# Patient Record
Sex: FEMALE | Race: WHITE | HISPANIC OR LATINO | Employment: UNEMPLOYED | URBAN - METROPOLITAN AREA
[De-identification: names, ages, dates, MRNs, and addresses within clinical notes are randomized per-mention and may not be internally consistent; named-entity substitution may affect disease eponyms.]

---

## 2017-01-26 PROBLEM — Z34.90 PREGNANCY WITH ONE FETUS: Status: ACTIVE | Noted: 2017-01-26

## 2017-01-31 ENCOUNTER — TELEPHONE (OUTPATIENT)
Dept: OBSTETRICS AND GYNECOLOGY | Facility: HOSPITAL | Age: 22
End: 2017-01-31

## 2017-02-01 ENCOUNTER — TELEPHONE (OUTPATIENT)
Dept: OBSTETRICS AND GYNECOLOGY | Facility: HOSPITAL | Age: 22
End: 2017-02-01

## 2017-02-01 NOTE — TELEPHONE ENCOUNTER
"Lactation Telephone Follow up:  Spoke with pt.Lactation Telephone Follow up:  Spoke with pt via [employer] language line op# [id number].  Reports baby breastfeeding well 8 - 12 times in 24 hours and only supplementing with 1 or 2 bottles per day.  Reports 4 -5 wets and 5 yellow stools in the past 24 hours.  Denies any c/o or concerns.  Reports that she has a pediatrician appt with bilirubin check tomorrow.  Encouraged to call hotline # prn.  States "understand" and verbalized appropriate recall.  "

## 2018-08-21 ENCOUNTER — HOSPITAL ENCOUNTER (EMERGENCY)
Facility: HOSPITAL | Age: 23
Discharge: HOME | End: 2018-08-21
Attending: EMERGENCY MEDICINE

## 2018-08-21 VITALS
DIASTOLIC BLOOD PRESSURE: 78 MMHG | SYSTOLIC BLOOD PRESSURE: 121 MMHG | BODY MASS INDEX: 27.48 KG/M2 | WEIGHT: 140 LBS | HEIGHT: 60 IN | HEART RATE: 87 BPM | TEMPERATURE: 99 F | OXYGEN SATURATION: 100 %

## 2018-08-21 DIAGNOSIS — K08.89 PAIN, DENTAL: Primary | ICD-10-CM

## 2018-08-21 LAB
B-HCG UR QL: NEGATIVE
CTP QC/QA: YES

## 2018-08-21 PROCEDURE — 99284 EMERGENCY DEPT VISIT MOD MDM: CPT

## 2018-08-21 PROCEDURE — 99283 EMERGENCY DEPT VISIT LOW MDM: CPT | Mod: 25,,, | Performed by: EMERGENCY MEDICINE

## 2018-08-21 PROCEDURE — 64450 NJX AA&/STRD OTHER PN/BRANCH: CPT | Mod: ,,, | Performed by: EMERGENCY MEDICINE

## 2018-08-21 PROCEDURE — 81025 URINE PREGNANCY TEST: CPT | Performed by: EMERGENCY MEDICINE

## 2018-08-21 RX ORDER — NAPROXEN SODIUM 550 MG/1
550 TABLET ORAL EVERY 12 HOURS PRN
Qty: 30 TABLET | Refills: 0 | Status: SHIPPED | OUTPATIENT
Start: 2018-08-21

## 2018-08-21 RX ORDER — PENICILLIN V POTASSIUM 500 MG/1
500 TABLET, FILM COATED ORAL 4 TIMES DAILY
Qty: 28 TABLET | Refills: 0 | Status: SHIPPED | OUTPATIENT
Start: 2018-08-21 | End: 2018-08-28

## 2018-08-22 NOTE — ED TRIAGE NOTES
Rachael Olson, a 22 y.o. female presents to the ED for dental pain to front teeth x 3 days. Pt notes subjective fever and chills and took ibuprofen for fever. PT stated its been a long time since she went to the dentist for the same issue however, the cost was high and could not get issue fixed. Denies chest pain, SOB, diarrhea, and vomiting.      Chief Complaint   Patient presents with    Dental Pain     Pt reprots upper frontal teeth pain. Pt reports pain with chewing also unreleived by OTC pain medication. Pt is Indonesian speaking only.     Review of patient's allergies indicates:  No Known Allergies  History reviewed. No pertinent past medical history.    Adult Physical Assessment  LOC: Rachael Olson, 22 y.o. female verified via two identifiers.  The patient is awake, alert, oriented and speaking appropriately at this time.  APPEARANCE: Patient resting comfortably and appears to be in no acute distress at this time. Patient is clean and well groomed, patient's clothing is properly fastened.  SKIN:The skin is warm and dry, color consistent with ethnicity, patient has normal skin turgor and moist mucus membranes, skin intact, no breakdown or brusing noted.  MUSCULOSKELETAL: Patient moving all extremities well, no obvious swelling or deformities noted.  RESPIRATORY: Airway is open and patent, respirations are spontaneous, patient has a normal effort and rate, no accessory muscle use noted.  CARDIAC: Patient has a normal rate and rhythm, no periphreal edema noted in any extremity, capillary refill < 3 seconds in all extremities  ABDOMEN: Soft and non tender to palpation, no abdominal distention noted. Bowel sounds present in all four quadrants.  NEUROLOGIC: Eyes open spontaneously, behavior appropriate to situation, follows commands, facial expression symmetrical, bilateral hand grasp equal and even, purposeful motor response noted, normal sensation in all extremities when touched with a finger.

## 2018-08-22 NOTE — ED PROVIDER NOTES
Source of History:  Patient via [employer]    Chief complaint:  Dental Pain (Pt reprots upper frontal teeth pain. Pt reports pain with chewing also unreleived by OTC pain medication. Pt is Irish speaking only.)      HPI:  Rachael Olson is a 22 y.o. female presenting with right upper frontal toothache that has been present intermittently for weeks.  She has taken antibiotics for it before but the pain returns.  No fever.    ROS: As per HPI and below:  General: No fever.  No chills.  Eyes: No visual changes.  Head: Notes headache.    Neurologic: No focal weakness.  No numbness.      Review of patient's allergies indicates:  No Known Allergies    No current facility-administered medications on file prior to encounter.      Current Outpatient Medications on File Prior to Encounter   Medication Sig Dispense Refill    ibuprofen (ADVIL,MOTRIN) 600 MG tablet Take 1 tablet (600 mg total) by mouth every 6 (six) hours. 60 tablet 1    oxycodone-acetaminophen (PERCOCET) 5-325 mg per tablet Take 1 tablet by mouth every 4 (four) hours as needed. 30 tablet 0    [DISCONTINUED] norgestimate-ethinyl estradiol (ORTHO TRI-CYCLEN,TRI-SPRINTEC) 0.18/0.215/0.25 mg-35 mcg (28) tablet Take 1 tablet by mouth once daily. 28 tablet 4    [DISCONTINUED] prenatal vit#103-iron-FA () 27 mg iron- 1 mg Tab Take 1 tablet by mouth once daily. 30 tablet 12       PMH:  As per HPI and below:  History reviewed. No pertinent past medical history.  History reviewed. No pertinent surgical history.    Social History     Socioeconomic History    Marital status:      Spouse name: None    Number of children: None    Years of education: None    Highest education level: None   Social Needs    Financial resource strain: None    Food insecurity - worry: None    Food insecurity - inability: None    Transportation needs - medical: None    Transportation needs - non-medical: None   Occupational History    None   Tobacco Use     Smoking status: Never Smoker    Smokeless tobacco: Never Used   Substance and Sexual Activity    Alcohol use: No    Drug use: No    Sexual activity: Yes     Partners: Male   Other Topics Concern    None   Social History Narrative    None       Family History   Problem Relation Age of Onset    Breast cancer Neg Hx     Colon cancer Neg Hx     Ovarian cancer Neg Hx        Physical Exam:    Vitals:    08/21/18 1954   BP: 121/78   Pulse: 87   Temp: 98.7 °F (37.1 °C)     Appearance: No acute distress.  Skin: No rashes seen.  Good turgor.  No abrasions.  No ecchymoses.  Eyes: No conjunctival injection.  ENT: Redness on the gumline above #6,#7.  Tender to palpation there.  No abscess.    Neck:  No lymphadenopathy.  Neurologic: Motor intact.  Sensation intact.  Cranial nerves intact.  Mental Status:  Alert and oriented x 3.  Appropriate, conversant.      Laboratory Studies:  Labs Reviewed   POCT URINE PREGNANCY       Procedure:  Infraorbital block done with bupivicaine/epinephrine.  4 cc injected.  Complete relief of symptoms.  Patient tolerated procedure well.    MDM:    22 y.o. female with dental pain.  Good relief with block.  Will d/c with nsaids/PCN; f/u dentist for definitive care.    Diagnostic Impression:    1. Pain, dental          Level of Service:  Level 3 plus block.         Geo Tomlin MD  08/21/18 [id number]